# Patient Record
Sex: MALE | Race: BLACK OR AFRICAN AMERICAN | NOT HISPANIC OR LATINO
[De-identification: names, ages, dates, MRNs, and addresses within clinical notes are randomized per-mention and may not be internally consistent; named-entity substitution may affect disease eponyms.]

---

## 2021-05-18 PROBLEM — Z00.00 ENCOUNTER FOR PREVENTIVE HEALTH EXAMINATION: Status: ACTIVE | Noted: 2021-05-18

## 2021-05-19 PROBLEM — Z82.49 FAMILY HISTORY OF HYPERTENSION: Status: ACTIVE | Noted: 2021-05-19

## 2021-05-19 PROBLEM — I10 HYPERTENSION: Status: ACTIVE | Noted: 2021-05-19

## 2021-05-19 PROBLEM — Z80.42 FAMILY HISTORY OF MALIGNANT NEOPLASM OF PROSTATE: Status: ACTIVE | Noted: 2021-05-19

## 2021-05-19 RX ORDER — SOLIFENACIN SUCCINATE 10 MG/1
TABLET ORAL
Refills: 0 | Status: ACTIVE | COMMUNITY

## 2021-05-19 RX ORDER — CLONIDINE HYDROCHLORIDE 0.3 MG/1
TABLET ORAL
Refills: 0 | Status: ACTIVE | COMMUNITY

## 2021-05-19 RX ORDER — AMLODIPINE BESYLATE 5 MG/1
TABLET ORAL
Refills: 0 | Status: ACTIVE | COMMUNITY

## 2021-05-20 ENCOUNTER — APPOINTMENT (OUTPATIENT)
Dept: UROLOGY | Facility: CLINIC | Age: 66
End: 2021-05-20
Payer: COMMERCIAL

## 2021-05-20 DIAGNOSIS — Z80.42 FAMILY HISTORY OF MALIGNANT NEOPLASM OF PROSTATE: ICD-10-CM

## 2021-05-20 DIAGNOSIS — Z82.49 FAMILY HISTORY OF ISCHEMIC HEART DISEASE AND OTHER DISEASES OF THE CIRCULATORY SYSTEM: ICD-10-CM

## 2021-05-20 DIAGNOSIS — I10 ESSENTIAL (PRIMARY) HYPERTENSION: ICD-10-CM

## 2021-05-20 PROCEDURE — 99072 ADDL SUPL MATRL&STAF TM PHE: CPT

## 2021-05-20 PROCEDURE — 99202 OFFICE O/P NEW SF 15 MIN: CPT

## 2021-05-20 RX ORDER — ASPIRIN 325 MG/1
TABLET, FILM COATED ORAL
Refills: 0 | Status: ACTIVE | COMMUNITY

## 2021-05-20 NOTE — ASSESSMENT
[FreeTextEntry1] : 65 year old Black man with PMHx HTN and LUTS who presents as an RDP referral for an elevated PSA and abnormal MRI. His PSA on 10/5/20 was 3.3 ng/mL. He underwent a standard biopsy with Dr Limon on 3/30/21 which was negative. He had a PSA drawn after the biopsy which was 5.0 (drawn 1 week after biopsy). After his negative biopsy, he had an MRI on 4/27/21 which demonstrated two PIRADS 4 lesions within the right peripheral zone. Unable to calculate PSAD as the size of the gland was not noted in the MRI report. He reports a + family hx of CaP in his father. He was diagnosed in his 60s and passed away from the disease at age 66. \par \par 1. Book for biopsy\par 2. Follow up in office for MRI review\par \par IRB Notification\par \par The patient has been made aware of the opportunity to participate in our MR US fusion guided biopsy trial testing the next generation biopsy technology.  This is an IRB approved trial (IRB #).  He is already being consented for a standard MR US fusion guided biopsy therefore there is no change in his clinical work flow.  He has been given a copy of the consent to review before the biopsy date and given an opportunity to contact us with any further questions.  He will be consented on the day of the biopsy procedure.\par \par He understands that many men with prostate cancer will die with the disease rather than of it and we also discussed the results large multi-center American and  prostate cancer screening trials. He also understands that PSA in and of itself does not diagnose prostate cancer but only assesses risk to a certain degree. The patient understands that to definitively screen for prostate cancer, a biopsy is required and this procedure has risks, including bleeding, infection, ED and urinary retention. The patient opted to move forward with the biopsy.\par \par The patient is aware to expect hematuria x 2 weeks and upto 4 weeks of hematospermia.  There is a risk of infection albeit much lower than a transrectal approach. In some cases patients can experience erectile dysfunction but this is usually self limiting.  Any fever/chills after the biopsy the patient is to contact the office and go to the ER for an immediate evaluation. He has been given paper instructions outlining these items - which includes medications to avoid prior to surgery.\par \par 1. CBC, BMP, PSA, Covid Test, UA UCx. EKG\par 2. Medical Clearance. Instructed to stop prophylactic  baby asa 7d prior\par 3. TP biopsy at [facility]\par 4. follow up 2 weeks after biopsy with his primary urologist or ourselves.\par 5. we will call with the path results once they are resulted.\par \par Areli Beltran NP, was present in the office and available for consultation throughout the entirety of this telemedicine visit.

## 2021-05-20 NOTE — HISTORY OF PRESENT ILLNESS
[Medical Office: ([facility])___] : at the medical office located in  [Home] : at home, [unfilled] , at the time of the visit. [Verbal consent obtained from patient] : the patient, [unfilled] [FreeTextEntry1] : Dear Dr. Romain Limon,\par \par Thank you so much for the referral to help care for your patient.\par \par \par Chief Complaint: Elevated PSA, abnormal MRI\par Date of first visit: 5/20/2021\par Occupation: \par \par  \par \par MILADY WASHINGTON  is a 65 year old Black man with PMHx HTN and LUTS who presents as an RDP referral for an elevated PSA and abnormal MRI.  He underwent a standard biopsy with Dr Limon on 3/30/20 which was negative. His PSA on 10/5/20 was 3.3 ng/mL. It has been increasing since his biopsy with his most recent PSA being 5.0 (4/7/21). He had an MRI on 4/27/21 which demonstrated two PIRADS 4 lesions within the right peripheral zone. He reports a + family hx of CaP in his father. He was diagnosed in his 60s and passed away from the disease at age 66. \par \par LUTS-reports frequency, urgency and nocturia. Was recently switched from tolterodine to solifenacin by Dr Limon. He feels his symptoms have much improved.\par \par Also reports ED.\par \par PSA Hx\par 5.0 4/7/21\par 3.3 10/5/20\par \par MRI Hx\par MRI at [facility] on 4/27/2021.  5.3 x 5.1 x 4.4cm prostate, with lesion1: PIRADS 4 lesion measuring 1.2x1.2x1cm, right apical peripheral zone, lesion 2: PIRADS 4 lesion measuring 1.2x0.7cm, right peripheral zone at the base. No LAD No EPE, No Bony Lesions.  The images have been reviewed and clinical implications discussed with the patient.\par \par Biopsy Hx\par 3/30/20 with Dr Limon-negative\par \par 05/20/2021\par IPSS 6 QOL 3\par GISEL []-reports ED\par \par Prostate cancer screening: the patient and I spoke at length about prostate cancer screening, its risks and its benefits. The patient has 2 (age, PSA) risk factors for prostate cancer.  He understands that many men with prostate cancer will die with the disease rather than of it and we also discussed the results large multi-center American and  prostate cancer screening trials. He also understands that PSA in and of itself does not diagnose prostate cancer but only assesses risk to a certain degree. The patient understands that to definitively screen for prostate cancer, a biopsy is required and this procedure has risks, including bleeding, infection, ED and urinary retention. The patient opted to proceed with fusion biopsy.\par \par The patient denies fevers, chills, nausea and or vomiting and no unexplained weight loss.\par \par All pertinent laboratory, films and physician notes were reviewed.  Questionnaire results were discussed with patient.

## 2021-05-20 NOTE — PHYSICAL EXAM
[General Appearance - Well Developed] : well developed [General Appearance - Well Nourished] : well nourished [Normal Appearance] : normal appearance [Well Groomed] : well groomed [General Appearance - In No Acute Distress] : no acute distress [] : no respiratory distress [Respiration, Rhythm And Depth] : normal respiratory rhythm and effort [Exaggerated Use Of Accessory Muscles For Inspiration] : no accessory muscle use [No Focal Deficits] : no focal deficits [Oriented To Time, Place, And Person] : oriented to person, place, and time [Affect] : the affect was normal [Mood] : the mood was normal [Not Anxious] : not anxious

## 2021-06-14 ENCOUNTER — APPOINTMENT (OUTPATIENT)
Dept: UROLOGY | Facility: CLINIC | Age: 66
End: 2021-06-14
Payer: COMMERCIAL

## 2021-06-14 VITALS
HEART RATE: 92 BPM | TEMPERATURE: 97.7 F | BODY MASS INDEX: 28.93 KG/M2 | SYSTOLIC BLOOD PRESSURE: 172 MMHG | WEIGHT: 180 LBS | HEIGHT: 66 IN | DIASTOLIC BLOOD PRESSURE: 71 MMHG

## 2021-06-14 DIAGNOSIS — R97.20 ELEVATED PROSTATE, SPECIFIC ANTIGEN [PSA]: ICD-10-CM

## 2021-06-14 PROCEDURE — 99072 ADDL SUPL MATRL&STAF TM PHE: CPT

## 2021-06-14 PROCEDURE — 99214 OFFICE O/P EST MOD 30 MIN: CPT

## 2021-06-14 NOTE — HISTORY OF PRESENT ILLNESS
[FreeTextEntry1] : Dear Dr. Romain Limon,\par \par Thank you so much for the referral to help care for your patient.\par \par \par Chief Complaint: Elevated PSA, abnormal MRI\par Date of first visit: 5/20/2021\par Occupation: \par \par MILADY WASHINGTON  is a 65 year old Black man with PMHx HTN and LUTS who presents as an RDP referral for an elevated PSA and abnormal MRI.  He underwent a standard biopsy with Dr Limon on 3/30/20 which was negative. His PSA on 10/5/20 was 3.3 ng/mL. It has been increasing since his biopsy with his most recent PSA being 5.0 (4/7/21). He had an MRI on 4/27/21 which demonstrated two PIRADS 4 lesions within the right peripheral zone. He reports a + family hx of CaP in his father. He was diagnosed in his 60s and passed away from the disease at age 66. \par \par LUTS-reports frequency, urgency and nocturia. Was recently switched from tolterodine to solifenacin by Dr Limon. He feels his symptoms have much improved.\par \par Also reports ED.\par \par PSA Hx\par 5.0 4/7/21\par 3.3 10/5/20\par \par MRI Hx\par MRI at [facility] on 4/27/2021.  5.3 x 5.1 x 4.4cm prostate (76 cc 3d vol), with lesion1: PIRADS 4 lesion measuring 1.2x1.2x1cm, right apical peripheral zone, lesion 2: PIRADS 4 lesion measuring 1.2x0.7cm, right peripheral zone at the base. No LAD No EPE, No Bony Lesions.  The images have been reviewed and clinical implications discussed with the patient.\par \par Biopsy Hx\par 3/30/20 with Dr Limon-negative\par \par 05/20/2021\par IPSS 6 QOL 3\par GISEL []-reports ED\par \par Prostate cancer screening: the patient and I spoke at length about prostate cancer screening, its risks and its benefits. The patient has 2 (age, PSA) risk factors for prostate cancer.  He understands that many men with prostate cancer will die with the disease rather than of it and we also discussed the results large multi-center American and  prostate cancer screening trials. He also understands that PSA in and of itself does not diagnose prostate cancer but only assesses risk to a certain degree. The patient understands that to definitively screen for prostate cancer, a biopsy is required and this procedure has risks, including bleeding, infection, ED and urinary retention. The patient opted to proceed with fusion biopsy.\par \par The patient denies fevers, chills, nausea and or vomiting and no unexplained weight loss.\par \par All pertinent laboratory, films and physician notes were reviewed.  Questionnaire results were discussed with patient.\par \par I spent 31 minutes of non-face to face time reviewing the patient's records, chart, uploading processing MR images, transferring MR images from PACS to an independent workstation, reviewing images on independent workstation, speaking with their physician and planning their prostate biopsy and preparation of an upcoming visit for 06/14/2021 .  The imaging and planning was highly complex and took this entire time. \par

## 2021-06-14 NOTE — ASSESSMENT
[FreeTextEntry1] : 65 year old Black man with PMHx HTN and LUTS who presents as an RDP referral for an elevated PSA and abnormal MRI. His PSA on 10/5/20 was 3.3 ng/mL. He underwent a standard biopsy with Dr Limon on 3/30/21 which was negative. He had a PSA drawn after the biopsy which was 5.0 (drawn 1 week after biopsy). After his negative biopsy, he had an MRI on 4/27/21 which demonstrated two PIRADS 4 lesions within the right peripheral zone. Unable to calculate PSAD as the size of the gland was not noted in the MRI report. He reports a + family hx of CaP in his father. He was diagnosed in his 60s and passed away from the disease at age 66. \par \par 1. Book for biopsy\par 2. MRI average quality - consider select Mdx if his biopsy is negative to better stratify his risk.\par \par IRB Notification\par \par The patient has been made aware of the opportunity to participate in our MR US fusion guided biopsy trial testing the next generation biopsy technology.  This is an IRB approved trial (IRB #).  He is already being consented for a standard MR US fusion guided biopsy therefore there is no change in his clinical work flow.  He has been given a copy of the consent to review before the biopsy date and given an opportunity to contact us with any further questions.  He will be consented on the day of the biopsy procedure.\par \par He understands that many men with prostate cancer will die with the disease rather than of it and we also discussed the results large multi-center American and  prostate cancer screening trials. He also understands that PSA in and of itself does not diagnose prostate cancer but only assesses risk to a certain degree. The patient understands that to definitively screen for prostate cancer, a biopsy is required and this procedure has risks, including bleeding, infection, ED and urinary retention. The patient opted to move forward with the biopsy.\par \par The patient is aware to expect hematuria x 2 weeks and upto 4 weeks of hematospermia.  There is a risk of infection albeit much lower than a transrectal approach. In some cases patients can experience erectile dysfunction but this is usually self limiting.  Any fever/chills after the biopsy the patient is to contact the office and go to the ER for an immediate evaluation. He has been given paper instructions outlining these items - which includes medications to avoid prior to surgery.\par \par 1. CBC, BMP, PSA, Covid Test, UA UCx. EKG\par 2. Medical Clearance. Instructed to stop prophylactic  baby asa 7d prior\par 3. TP biopsy at [facility]\par 4. follow up 2 weeks after biopsy with his primary urologist or ourselves.\par 5. we will call with the path results once they are resulted.\par \par Areli Beltran NP, was present in the office and available for consultation throughout the entirety of this telemedicine visit.\par \par Thank you very much for allowing me to assist in the care of this patient. Should you have any additional questions or concerns please do not hesitate to contact me.\par \par \par Sincerely,\par \par \par Chao Crabtree D.O.\par  of Urology and Radiology\par  of Urology at [facility]\par System Director for Prostate Cancer\par [address] [place] [address], 5[address] Floor [facility], [facility]\par Phone: [phone]\par

## 2021-06-14 NOTE — PHYSICAL EXAM
[Abdomen Soft] : soft [Urethral Meatus] : meatus normal [Penis Abnormality] : normal uncircumcised penis [Prostate Tenderness] : the prostate was not tender [No Prostate Nodules] : no prostate nodules [Prostate Size ___ gm] : prostate size [unfilled] gm [Edema] : no peripheral edema [Normal Station and Gait] : the gait and station were normal for the patient's age

## 2021-06-16 ENCOUNTER — TRANSCRIPTION ENCOUNTER (OUTPATIENT)
Age: 66
End: 2021-06-16

## 2021-06-17 ENCOUNTER — APPOINTMENT (OUTPATIENT)
Dept: UROLOGY | Facility: AMBULATORY SURGERY CENTER | Age: 66
End: 2021-06-17

## 2021-06-17 ENCOUNTER — RESULT REVIEW (OUTPATIENT)
Age: 66
End: 2021-06-17

## 2021-06-17 ENCOUNTER — OUTPATIENT (OUTPATIENT)
Dept: OUTPATIENT SERVICES | Facility: HOSPITAL | Age: 66
LOS: 1 days | Discharge: HOME | End: 2021-06-17
Payer: COMMERCIAL

## 2021-06-17 PROCEDURE — 88305 TISSUE EXAM BY PATHOLOGIST: CPT | Mod: 26

## 2021-06-17 PROCEDURE — 76872 US TRANSRECTAL: CPT | Mod: 26,59

## 2021-06-17 PROCEDURE — 55706 BX PRST8 NDL SAT SAMPLING: CPT | Mod: 22

## 2021-06-18 ENCOUNTER — NON-APPOINTMENT (OUTPATIENT)
Age: 66
End: 2021-06-18

## 2021-06-23 ENCOUNTER — NON-APPOINTMENT (OUTPATIENT)
Age: 66
End: 2021-06-23

## 2021-06-23 LAB — SURGICAL PATHOLOGY STUDY: SIGNIFICANT CHANGE UP
